# Patient Record
Sex: FEMALE | URBAN - METROPOLITAN AREA
[De-identification: names, ages, dates, MRNs, and addresses within clinical notes are randomized per-mention and may not be internally consistent; named-entity substitution may affect disease eponyms.]

---

## 2023-04-13 ENCOUNTER — WEB ENCOUNTER (OUTPATIENT)
Dept: URBAN - METROPOLITAN AREA CLINIC 98 | Facility: CLINIC | Age: 53
End: 2023-04-13

## 2023-04-14 ENCOUNTER — LAB OUTSIDE AN ENCOUNTER (OUTPATIENT)
Dept: URBAN - METROPOLITAN AREA CLINIC 98 | Facility: CLINIC | Age: 53
End: 2023-04-14

## 2023-04-14 ENCOUNTER — OFFICE VISIT (OUTPATIENT)
Dept: URBAN - METROPOLITAN AREA CLINIC 98 | Facility: CLINIC | Age: 53
End: 2023-04-14
Payer: COMMERCIAL

## 2023-04-14 VITALS
WEIGHT: 275 LBS | HEIGHT: 70 IN | TEMPERATURE: 97 F | DIASTOLIC BLOOD PRESSURE: 73 MMHG | HEART RATE: 82 BPM | SYSTOLIC BLOOD PRESSURE: 104 MMHG | BODY MASS INDEX: 39.37 KG/M2

## 2023-04-14 DIAGNOSIS — R16.1 SPLENOMEGALY: ICD-10-CM

## 2023-04-14 DIAGNOSIS — K76.0 FATTY LIVER: ICD-10-CM

## 2023-04-14 PROBLEM — 197321007: Status: ACTIVE | Noted: 2023-04-14

## 2023-04-14 PROBLEM — 16294009: Status: ACTIVE | Noted: 2023-04-14

## 2023-04-14 PROCEDURE — 99204 OFFICE O/P NEW MOD 45 MIN: CPT | Performed by: INTERNAL MEDICINE

## 2023-04-14 RX ORDER — HYDROCODONE BITARTRATE AND ACETAMINOPHEN 10; 325 MG/1; MG/1
TAKE ONE AND ONE-HALF TABLETS BY MOUTH EVERY 6 HOURS AS NEEDED FOR PAIN TABLET ORAL
Qty: 180 UNSPECIFIED | Refills: 0 | Status: ACTIVE | COMMUNITY

## 2023-04-14 RX ORDER — TRAMADOL HYDROCHLORIDE 300 MG/1
TAKE ONE TABLET BY MOUTH EVERY MORNING TABLET, EXTENDED RELEASE ORAL
Qty: 30 UNSPECIFIED | Refills: 0 | Status: ACTIVE | COMMUNITY

## 2023-04-14 RX ORDER — PREGABALIN CAPSULES, CV 150 MG/1
TAKE 1 CAPSULE BY MOUTH EVERY NIGHT AT BEDTIME CAPSULE ORAL
Qty: 30 EACH | Refills: 0 | Status: ACTIVE | COMMUNITY

## 2023-04-14 RX ORDER — BACLOFEN 10 MG/1
TAKE ONE TABLET BY MOUTH EVERY MORNING AND TAKE ONE TABLET BY MOUTH EVERY EVENING AND TAKE ONE TABLET BY MOUTH BEFORE BEDTIME TABLET ORAL
Qty: 90 UNSPECIFIED | Refills: 0 | Status: ACTIVE | COMMUNITY

## 2023-04-14 RX ORDER — TIZANIDINE 2 MG/1
TAKE ONE TABLET BY MOUTH EVERY 8 HOURS AS NEEDED TABLET ORAL
Qty: 90 UNSPECIFIED | Refills: 0 | Status: ACTIVE | COMMUNITY

## 2023-04-14 RX ORDER — METOLAZONE 5 MG/1
TABLET ORAL
Qty: 90 TABLET | Status: ACTIVE | COMMUNITY

## 2023-04-14 RX ORDER — FUROSEMIDE 40 MG/1
TABLET ORAL
Qty: 90 TABLET | Status: ACTIVE | COMMUNITY

## 2023-04-14 RX ORDER — POTASSIUM CHLORIDE 1500 MG/1
TABLET, EXTENDED RELEASE ORAL
Qty: 540 TABLET | Status: ACTIVE | COMMUNITY

## 2023-04-14 RX ORDER — ESTRADIOL AND NORETHINDRONE ACETATE .5; .1 MG/1; MG/1
TABLET ORAL
Qty: 84 TABLET | Status: ACTIVE | COMMUNITY

## 2023-04-14 RX ORDER — QUETIAPINE FUMARATE 25 MG/1
TAKE ONE TO TWO TABLETS BY MOUTH AT BEDTIME TABLET ORAL
Qty: 60 UNSPECIFIED | Refills: 0 | Status: ACTIVE | COMMUNITY

## 2023-04-14 RX ORDER — ADALIMUMAB 40MG/0.4ML
KIT SUBCUTANEOUS
Qty: 2 EACH | Status: ACTIVE | COMMUNITY

## 2023-04-14 RX ORDER — ESTRADIOL 1 MG/1
TABLET ORAL
Qty: 30 TABLET | Status: ACTIVE | COMMUNITY

## 2023-04-14 RX ORDER — ALPRAZOLAM 0.5 MG/1
TAKE ONE TABLET BY MOUTH ONE TIME DAILY AS NEEDED FOR SLEEP OR ANXIETY TABLET ORAL
Qty: 20 UNSPECIFIED | Refills: 0 | Status: ACTIVE | COMMUNITY

## 2023-04-14 RX ORDER — IVERMECTIN 10 MG/G
APPLY A PEA-SIZED AMOUNT TOPICALLY ONE TIME DAILY TO COVER AREAS OF FACE WITH THIN LAYER ; AVOIDING THE EYES AND LIPS CREAM TOPICAL
Qty: 45 UNSPECIFIED | Refills: 2 | Status: ACTIVE | COMMUNITY

## 2023-04-14 RX ORDER — DICLOFENAC SODIUM 10 MG/G
GEL TOPICAL
Qty: 1500 GRAM | Status: ACTIVE | COMMUNITY

## 2023-04-14 RX ORDER — DULOXETINE HYDROCHLORIDE 60 MG/1
CAPSULE, DELAYED RELEASE ORAL
Qty: 180 CAPSULE | Status: ACTIVE | COMMUNITY

## 2023-04-14 RX ORDER — PROGESTERONE 100 MG/1
CAPSULE ORAL
Qty: 30 CAPSULE | Status: ACTIVE | COMMUNITY

## 2023-04-14 RX ORDER — METHOCARBAMOL TABLETS 750 MG/1
TAKE ONE TABLET BY MOUTH TWICE A DAY AS NEEDED TABLET, COATED ORAL
Qty: 60 UNSPECIFIED | Refills: 1 | Status: ACTIVE | COMMUNITY

## 2023-04-14 RX ORDER — ONDANSETRON 8 MG/1
TAKE ONE TABLET BY MOUTH EVERY 8 HOURS AS NEEDED FOR NAUSEA TABLET, FILM COATED ORAL
Qty: 42 UNSPECIFIED | Refills: 0 | Status: ACTIVE | COMMUNITY

## 2023-04-14 RX ORDER — CEPHALEXIN 500 MG/1
CAPSULE ORAL
Qty: 10 CAPSULE | Status: ACTIVE | COMMUNITY

## 2023-04-14 NOTE — HPI-TODAY'S VISIT:
PMH of ankalosing spondylitis on Humira Recently started hormone replacement with recent labs showing elevated lipids She went to cardiac screen, and found to have nodular liver contour with splenomegaly and radha liver LFTs all WNL Plts WNL No h/o liver disease No FHx liver disease Denies EtOH use She does take APAP regularly for chronic pain

## 2023-04-15 LAB
A/G RATIO: 1.6
ALBUMIN: 4.5
ALKALINE PHOSPHATASE: 69
ALT (SGPT): 16
AST (SGOT): 16
BASO (ABSOLUTE): 0
BASOS: 1
BILIRUBIN, TOTAL: 0.8
BUN/CREATININE RATIO: 24
BUN: 17
CALCIUM: 10.5
CARBON DIOXIDE, TOTAL: 23
CHLORIDE: 102
CREATININE: 0.7
EGFR: 103
EOS (ABSOLUTE): 0.1
EOS: 1
GGT: 13
GLOBULIN, TOTAL: 2.8
GLUCOSE: 86
HBSAG SCREEN: NEGATIVE
HCV AB: NON REACTIVE
HEMATOCRIT: 39.3
HEMATOLOGY COMMENTS:: (no result)
HEMOGLOBIN: 12.9
HEP B CORE AB, TOT: NEGATIVE
IMMATURE CELLS: (no result)
IMMATURE GRANS (ABS): 0
IMMATURE GRANULOCYTES: 0
INTERPRETATION:: (no result)
LYMPHS (ABSOLUTE): 2.2
LYMPHS: 40
MCH: 28
MCHC: 32.8
MCV: 85
MONOCYTES(ABSOLUTE): 0.4
MONOCYTES: 7
NEUTROPHILS (ABSOLUTE): 2.8
NEUTROPHILS: 51
NRBC: (no result)
PLATELETS: 243
POTASSIUM: 4.6
PROTEIN, TOTAL: 7.3
RBC: 4.61
RDW: 13.1
SODIUM: 140
WBC: 5.4

## 2023-05-22 ENCOUNTER — TELEPHONE ENCOUNTER (OUTPATIENT)
Dept: URBAN - METROPOLITAN AREA CLINIC 98 | Facility: CLINIC | Age: 53
End: 2023-05-22

## 2023-05-22 ENCOUNTER — LAB OUTSIDE AN ENCOUNTER (OUTPATIENT)
Dept: URBAN - METROPOLITAN AREA CLINIC 98 | Facility: CLINIC | Age: 53
End: 2023-05-22

## 2023-05-22 PROBLEM — 169255008: Status: ACTIVE | Noted: 2023-05-22

## 2023-05-22 PROBLEM — 300331000: Status: ACTIVE | Noted: 2023-05-22

## 2023-05-25 ENCOUNTER — LAB OUTSIDE AN ENCOUNTER (OUTPATIENT)
Dept: URBAN - METROPOLITAN AREA CLINIC 98 | Facility: CLINIC | Age: 53
End: 2023-05-25

## 2023-05-25 ENCOUNTER — TELEPHONE ENCOUNTER (OUTPATIENT)
Dept: URBAN - METROPOLITAN AREA CLINIC 98 | Facility: CLINIC | Age: 53
End: 2023-05-25

## 2023-06-20 ENCOUNTER — LAB OUTSIDE AN ENCOUNTER (OUTPATIENT)
Dept: URBAN - METROPOLITAN AREA TELEHEALTH 2 | Facility: TELEHEALTH | Age: 53
End: 2023-06-20

## 2023-06-20 ENCOUNTER — OFFICE VISIT (OUTPATIENT)
Dept: URBAN - METROPOLITAN AREA TELEHEALTH 2 | Facility: TELEHEALTH | Age: 53
End: 2023-06-20
Payer: COMMERCIAL

## 2023-06-20 ENCOUNTER — DASHBOARD ENCOUNTERS (OUTPATIENT)
Age: 53
End: 2023-06-20

## 2023-06-20 DIAGNOSIS — K76.9 LIVER LESION: ICD-10-CM

## 2023-06-20 DIAGNOSIS — R16.1 SPLENOMEGALY: ICD-10-CM

## 2023-06-20 DIAGNOSIS — K76.0 FATTY LIVER: ICD-10-CM

## 2023-06-20 DIAGNOSIS — Z12.11 COLON CANCER SCREENING: ICD-10-CM

## 2023-06-20 PROCEDURE — 99214 OFFICE O/P EST MOD 30 MIN: CPT | Performed by: INTERNAL MEDICINE

## 2023-06-20 RX ORDER — HYDROCODONE BITARTRATE AND ACETAMINOPHEN 10; 325 MG/1; MG/1
TAKE ONE AND ONE-HALF TABLETS BY MOUTH EVERY 6 HOURS AS NEEDED FOR PAIN TABLET ORAL
Qty: 180 UNSPECIFIED | Refills: 0 | Status: ACTIVE | COMMUNITY

## 2023-06-20 RX ORDER — IVERMECTIN 10 MG/G
APPLY A PEA-SIZED AMOUNT TOPICALLY ONE TIME DAILY TO COVER AREAS OF FACE WITH THIN LAYER ; AVOIDING THE EYES AND LIPS CREAM TOPICAL
Qty: 45 UNSPECIFIED | Refills: 2 | Status: ACTIVE | COMMUNITY

## 2023-06-20 RX ORDER — FUROSEMIDE 40 MG/1
TABLET ORAL
Qty: 90 TABLET | Status: ACTIVE | COMMUNITY

## 2023-06-20 RX ORDER — POTASSIUM CHLORIDE 1500 MG/1
TABLET, EXTENDED RELEASE ORAL
Qty: 540 TABLET | Status: ACTIVE | COMMUNITY

## 2023-06-20 RX ORDER — ESTRADIOL 1 MG/1
TABLET ORAL
Qty: 30 TABLET | Status: ACTIVE | COMMUNITY

## 2023-06-20 RX ORDER — PROGESTERONE 100 MG/1
CAPSULE ORAL
Qty: 30 CAPSULE | Status: ACTIVE | COMMUNITY

## 2023-06-20 RX ORDER — DICLOFENAC SODIUM 10 MG/G
GEL TOPICAL
Qty: 1500 GRAM | Status: ACTIVE | COMMUNITY

## 2023-06-20 RX ORDER — METOLAZONE 5 MG/1
TABLET ORAL
Qty: 90 TABLET | Status: ACTIVE | COMMUNITY

## 2023-06-20 RX ORDER — BACLOFEN 10 MG/1
TAKE ONE TABLET BY MOUTH EVERY MORNING AND TAKE ONE TABLET BY MOUTH EVERY EVENING AND TAKE ONE TABLET BY MOUTH BEFORE BEDTIME TABLET ORAL
Qty: 90 UNSPECIFIED | Refills: 0 | Status: ACTIVE | COMMUNITY

## 2023-06-20 RX ORDER — CEPHALEXIN 500 MG/1
CAPSULE ORAL
Qty: 10 CAPSULE | Status: ACTIVE | COMMUNITY

## 2023-06-20 RX ORDER — QUETIAPINE FUMARATE 25 MG/1
TAKE ONE TO TWO TABLETS BY MOUTH AT BEDTIME TABLET ORAL
Qty: 60 UNSPECIFIED | Refills: 0 | Status: ACTIVE | COMMUNITY

## 2023-06-20 RX ORDER — PREGABALIN CAPSULES, CV 150 MG/1
TAKE 1 CAPSULE BY MOUTH EVERY NIGHT AT BEDTIME CAPSULE ORAL
Qty: 30 EACH | Refills: 0 | Status: ACTIVE | COMMUNITY

## 2023-06-20 RX ORDER — TIZANIDINE 2 MG/1
TAKE ONE TABLET BY MOUTH EVERY 8 HOURS AS NEEDED TABLET ORAL
Qty: 90 UNSPECIFIED | Refills: 0 | Status: ACTIVE | COMMUNITY

## 2023-06-20 RX ORDER — METHOCARBAMOL TABLETS 750 MG/1
TAKE ONE TABLET BY MOUTH TWICE A DAY AS NEEDED TABLET, COATED ORAL
Qty: 60 UNSPECIFIED | Refills: 1 | Status: ACTIVE | COMMUNITY

## 2023-06-20 RX ORDER — ONDANSETRON 8 MG/1
TAKE ONE TABLET BY MOUTH EVERY 8 HOURS AS NEEDED FOR NAUSEA TABLET, FILM COATED ORAL
Qty: 42 UNSPECIFIED | Refills: 0 | Status: ACTIVE | COMMUNITY

## 2023-06-20 RX ORDER — TRAMADOL HYDROCHLORIDE 300 MG/1
TAKE ONE TABLET BY MOUTH EVERY MORNING TABLET, EXTENDED RELEASE ORAL
Qty: 30 UNSPECIFIED | Refills: 0 | Status: ACTIVE | COMMUNITY

## 2023-06-20 RX ORDER — ESTRADIOL AND NORETHINDRONE ACETATE .5; .1 MG/1; MG/1
TABLET ORAL
Qty: 84 TABLET | Status: ACTIVE | COMMUNITY

## 2023-06-20 RX ORDER — ALPRAZOLAM 0.5 MG/1
TAKE ONE TABLET BY MOUTH ONE TIME DAILY AS NEEDED FOR SLEEP OR ANXIETY TABLET ORAL
Qty: 20 UNSPECIFIED | Refills: 0 | Status: ACTIVE | COMMUNITY

## 2023-06-20 RX ORDER — DULOXETINE HYDROCHLORIDE 60 MG/1
CAPSULE, DELAYED RELEASE ORAL
Qty: 180 CAPSULE | Status: ACTIVE | COMMUNITY

## 2023-06-20 RX ORDER — ADALIMUMAB 40MG/0.4ML
KIT SUBCUTANEOUS
Qty: 2 EACH | Status: ACTIVE | COMMUNITY

## 2023-06-20 NOTE — HPI-TODAY'S VISIT:
f/u visit After last visit, LFTs WNL US with nodular liver and cystic structures 3 phase CT with "complex cysts in left and small rt dome cyst" Discussed with outside radiology and no clear concerning features on wet read of scan She is currently sick with "orange urine"  . PRIOR VISIT: [facility] Recently started hormone replacement with recent labs showing elevated lipids She went to cardiac screen, and found to have nodular liver contour with splenomegaly and fatty liver LFTs all WNL Plts WNL No h/o liver disease No FHx liver disease Denies EtOH use She does take APAP regularly for chronic pain

## 2023-06-27 ENCOUNTER — TELEPHONE ENCOUNTER (OUTPATIENT)
Dept: URBAN - METROPOLITAN AREA CLINIC 98 | Facility: CLINIC | Age: 53
End: 2023-06-27

## 2023-12-01 ENCOUNTER — WEB ENCOUNTER (OUTPATIENT)
Dept: URBAN - METROPOLITAN AREA CLINIC 98 | Facility: CLINIC | Age: 53
End: 2023-12-01

## 2023-12-12 ENCOUNTER — LAB OUTSIDE AN ENCOUNTER (OUTPATIENT)
Dept: URBAN - METROPOLITAN AREA TELEHEALTH 2 | Facility: TELEHEALTH | Age: 53
End: 2023-12-12

## 2023-12-13 ENCOUNTER — WEB ENCOUNTER (OUTPATIENT)
Dept: URBAN - METROPOLITAN AREA CLINIC 98 | Facility: CLINIC | Age: 53
End: 2023-12-13

## 2023-12-13 ENCOUNTER — OFFICE VISIT (OUTPATIENT)
Dept: URBAN - METROPOLITAN AREA TELEHEALTH 2 | Facility: TELEHEALTH | Age: 53
End: 2023-12-13
Payer: COMMERCIAL

## 2023-12-13 DIAGNOSIS — K76.0 FATTY LIVER: ICD-10-CM

## 2023-12-13 DIAGNOSIS — R93.89 ABNORMAL ULTRASOUND: ICD-10-CM

## 2023-12-13 DIAGNOSIS — R16.1 SPLENOMEGALY: ICD-10-CM

## 2023-12-13 DIAGNOSIS — K76.9 LIVER LESION: ICD-10-CM

## 2023-12-13 PROCEDURE — 99214 OFFICE O/P EST MOD 30 MIN: CPT | Performed by: INTERNAL MEDICINE

## 2023-12-13 RX ORDER — PREGABALIN CAPSULES, CV 150 MG/1
TAKE 1 CAPSULE BY MOUTH EVERY NIGHT AT BEDTIME CAPSULE ORAL
Qty: 30 EACH | Refills: 0 | Status: ACTIVE | COMMUNITY

## 2023-12-13 RX ORDER — BACLOFEN 10 MG/1
TAKE ONE TABLET BY MOUTH EVERY MORNING AND TAKE ONE TABLET BY MOUTH EVERY EVENING AND TAKE ONE TABLET BY MOUTH BEFORE BEDTIME TABLET ORAL
Qty: 90 UNSPECIFIED | Refills: 0 | Status: ACTIVE | COMMUNITY

## 2023-12-13 RX ORDER — DULOXETINE HYDROCHLORIDE 60 MG/1
CAPSULE, DELAYED RELEASE ORAL
Qty: 180 CAPSULE | Status: ACTIVE | COMMUNITY

## 2023-12-13 RX ORDER — CEPHALEXIN 500 MG/1
CAPSULE ORAL
Qty: 10 CAPSULE | Status: ACTIVE | COMMUNITY

## 2023-12-13 RX ORDER — DICLOFENAC SODIUM 10 MG/G
GEL TOPICAL
Qty: 1500 GRAM | Status: ACTIVE | COMMUNITY

## 2023-12-13 RX ORDER — POTASSIUM CHLORIDE 1500 MG/1
TABLET, EXTENDED RELEASE ORAL
Qty: 540 TABLET | Status: ACTIVE | COMMUNITY

## 2023-12-13 RX ORDER — TRAMADOL HYDROCHLORIDE 300 MG/1
TAKE ONE TABLET BY MOUTH EVERY MORNING TABLET, EXTENDED RELEASE ORAL
Qty: 30 UNSPECIFIED | Refills: 0 | Status: ACTIVE | COMMUNITY

## 2023-12-13 RX ORDER — PROGESTERONE 100 MG/1
CAPSULE ORAL
Qty: 30 CAPSULE | Status: ACTIVE | COMMUNITY

## 2023-12-13 RX ORDER — ALPRAZOLAM 0.5 MG/1
TAKE ONE TABLET BY MOUTH ONE TIME DAILY AS NEEDED FOR SLEEP OR ANXIETY TABLET ORAL
Qty: 20 UNSPECIFIED | Refills: 0 | Status: ACTIVE | COMMUNITY

## 2023-12-13 RX ORDER — METHOCARBAMOL TABLETS 750 MG/1
TAKE ONE TABLET BY MOUTH TWICE A DAY AS NEEDED TABLET, COATED ORAL
Qty: 60 UNSPECIFIED | Refills: 1 | Status: ACTIVE | COMMUNITY

## 2023-12-13 RX ORDER — METOLAZONE 5 MG/1
TABLET ORAL
Qty: 90 TABLET | Status: ACTIVE | COMMUNITY

## 2023-12-13 RX ORDER — FUROSEMIDE 40 MG/1
TABLET ORAL
Qty: 90 TABLET | Status: ACTIVE | COMMUNITY

## 2023-12-13 RX ORDER — IVERMECTIN 10 MG/G
APPLY A PEA-SIZED AMOUNT TOPICALLY ONE TIME DAILY TO COVER AREAS OF FACE WITH THIN LAYER ; AVOIDING THE EYES AND LIPS CREAM TOPICAL
Qty: 45 UNSPECIFIED | Refills: 2 | Status: ACTIVE | COMMUNITY

## 2023-12-13 RX ORDER — QUETIAPINE FUMARATE 25 MG/1
TAKE ONE TO TWO TABLETS BY MOUTH AT BEDTIME TABLET ORAL
Qty: 60 UNSPECIFIED | Refills: 0 | Status: ACTIVE | COMMUNITY

## 2023-12-13 RX ORDER — ADALIMUMAB 40MG/0.4ML
KIT SUBCUTANEOUS
Qty: 2 EACH | Status: ACTIVE | COMMUNITY

## 2023-12-13 RX ORDER — HYDROCODONE BITARTRATE AND ACETAMINOPHEN 10; 325 MG/1; MG/1
TAKE ONE AND ONE-HALF TABLETS BY MOUTH EVERY 6 HOURS AS NEEDED FOR PAIN TABLET ORAL
Qty: 180 UNSPECIFIED | Refills: 0 | Status: ACTIVE | COMMUNITY

## 2023-12-13 RX ORDER — ESTRADIOL AND NORETHINDRONE ACETATE .5; .1 MG/1; MG/1
TABLET ORAL
Qty: 84 TABLET | Status: ACTIVE | COMMUNITY

## 2023-12-13 RX ORDER — ESTRADIOL 1 MG/1
TABLET ORAL
Qty: 30 TABLET | Status: ACTIVE | COMMUNITY

## 2023-12-13 RX ORDER — ONDANSETRON 8 MG/1
TAKE ONE TABLET BY MOUTH EVERY 8 HOURS AS NEEDED FOR NAUSEA TABLET, FILM COATED ORAL
Qty: 42 UNSPECIFIED | Refills: 0 | Status: ACTIVE | COMMUNITY

## 2023-12-13 RX ORDER — TIZANIDINE 2 MG/1
TAKE ONE TABLET BY MOUTH EVERY 8 HOURS AS NEEDED TABLET ORAL
Qty: 90 UNSPECIFIED | Refills: 0 | Status: ACTIVE | COMMUNITY

## 2023-12-13 NOTE — HPI-TODAY'S VISIT:
f/u visit Taking care of mother Due for three phase CT to f/u liver cyst No changes from GI standpoint  . PRIOR VISIT: After last visit, LFTs WNL US with nodular liver and cystic structures 3 phase CT with "complex cysts in left and small rt dome cyst" Discussed with outside radiology and no clear concerning features on wet read of scan She is currently sick with "orange urine"  . PRIOR VISIT: PMH of ankalosing spondylitis on Humira Recently started hormone replacement with recent labs showing elevated lipids She went to cardiac screen, and found to have nodular liver contour with splenomegaly and fatty liver LFTs all WNL Plts WNL No h/o liver disease No FHx liver disease Denies EtOH use She does take APAP regularly for chronic pain

## 2023-12-22 ENCOUNTER — WEB ENCOUNTER (OUTPATIENT)
Dept: URBAN - METROPOLITAN AREA CLINIC 98 | Facility: CLINIC | Age: 53
End: 2023-12-22

## 2024-06-09 ENCOUNTER — WEB ENCOUNTER (OUTPATIENT)
Dept: URBAN - METROPOLITAN AREA CLINIC 98 | Facility: CLINIC | Age: 54
End: 2024-06-09

## 2025-02-25 ENCOUNTER — WEB ENCOUNTER (OUTPATIENT)
Dept: URBAN - METROPOLITAN AREA CLINIC 98 | Facility: CLINIC | Age: 55
End: 2025-02-25

## 2025-03-24 ENCOUNTER — WEB ENCOUNTER (OUTPATIENT)
Dept: URBAN - METROPOLITAN AREA CLINIC 98 | Facility: CLINIC | Age: 55
End: 2025-03-24